# Patient Record
Sex: FEMALE | Race: WHITE | NOT HISPANIC OR LATINO | URBAN - METROPOLITAN AREA
[De-identification: names, ages, dates, MRNs, and addresses within clinical notes are randomized per-mention and may not be internally consistent; named-entity substitution may affect disease eponyms.]

---

## 2020-06-25 LAB
HEP C HIM: NORMAL
HIV 1&2 EXT: NORMAL

## 2020-07-22 ENCOUNTER — TRANSFERRED RECORDS (OUTPATIENT)
Dept: MULTI SPECIALTY CLINIC | Facility: CLINIC | Age: 25
End: 2020-07-22

## 2020-07-22 LAB — PAP-ABSTRACT: NORMAL

## 2020-12-03 ENCOUNTER — TELEPHONE (OUTPATIENT)
Dept: TRANSPLANT | Facility: CLINIC | Age: 25
End: 2020-12-03

## 2020-12-03 NOTE — TELEPHONE ENCOUNTER
"Close Window   Print This Page   Expand All  Collapse All  [facility] BREEZE  [id number]      LIVING KIDNEY DONOR EVALUATION  Donor First Name tonny Donor MRN [id number]   Donor Last Name danielle Completed 2016 12:53 PM    1989 Record ID [id number]   BREEZE Screen PASSED     Intended Recipient  Recipient First Name giovani Recipient MRN    Recipient Last Name jas Relationship Member of the same community   Recipient  1989 Recipient Diagnosis    Recipient's ABO      Donor Information  Age 26 Gender Female   Ht 165 cm (5' 5'') Race    Wt 90.2 kg (199 lbs) Ethnicity Not /   BMI 33.10 kg/m  Preferred Language English      Required No     Blood Type O   Demographics  Home Address [address] [place] [address] # [phone]   [facility] Alternate # [phone]   Zip Code [address] Type    Country [place] Preferred Contact day Tue, Mon, Thur, Wed, Fri   Email [email or web address] Preferred Contact time 1:00 PM-4:00 PM   &&   Donor's Medical Information  Medical History  Medications None Reported   Surgical History  Allergies Penicillin : Rash   Social History Tobacco: Current (1/4 ppd x 7 years)   EtOH: Rare (1-2 drinks/month)   Illicit Drug Use: Remote: Marijuana Last Used  Self-Reported Functional Status \"I am able to participate in strenuous sports such as swimming, singles tennis, football, basketball, or skiing\"   Exercise (1 X per week)   Family Medical History Hypertension (Grandparent, Aunt or Uncle)   Cancer (Aunt or Uncle, Mother, Father, Grandparent) Exercise Frequency Exercise (1 X per week)   Review of Organ Systems  Review of Systems Heart or Circulatory System=No   Airway or Lungs=No   Kidneys and Bladder=No   Digestive or Liver=No   Muscles,Bones,Joints=No   Diabetes,Thyroid,Adrenal,Endocrine Disorder=No   Blood Disorder=No   Neuro/Psych=No   Cancer=No   Female Health=No   Immune Diseases=No   &&   Donor's " Social Information  Marital Status Single Living Accommodation Lives in rented accommodation   Level of 's or technical degree complete Living Arrangement With spouse   Employment Status Part Time Concerns: health and life insurance    Employer [employer] [place] [employer] Concerns: job security and lost income    Occupation      Medical Insurance Status No medical insurance     High Risk Behavior  High Risk Behaviors Illicit IV drug use < 5yrs. (NO)   Other high risk sexual contact < 12 months. (NO)   Commercial sex < 12 months. (NO)   Blood transfusion < 12 months. (NO)   Reason for Donation  Referral Friend or Family of Tx Candidate Reason for Donation I am O+ blood type, healthy, and he is in need of a kidney.   Permission to Disclose Inquiry Yes Patient Comments    Donor Motivation Level Ready to start evaluation with reservations     PCP Contact  PCP Name    PCP [facility]    PCP [facility]    PCP Phone    Emergency Contact  First Name LYNN First Name DAVIN   Last Name AGUSTIN Last Name NOBLE   Phone # [phone] Phone # [phone]   Phone Type  Phone Type    Relationship Spouse Relationship Mother   Office Use  Reviewed By Olya Pack   Reviewed 12/3/2020 12:13 PM   Admin Folder Archive   Comments    Lost for Followup    Extended Comments    [facility] ID [email or web address]:[id number] survey status

## 2020-12-04 DIAGNOSIS — Z00.5 TRANSPLANT DONOR EVALUATION: Primary | ICD-10-CM

## 2020-12-04 NOTE — TELEPHONE ENCOUNTER
Is abo O. Interested in PEP.Hx Asthma and is under control. Tested + for COVID 11/2 but since is neg and OK.Very occ Advil use-discussed risks.Had listed IBS which occurred in 10th grade but settled down and no problems now.Has 1 beer every day.Will send info/forms. To [facility] for labs.

## 2020-12-11 ENCOUNTER — MEDICAL CORRESPONDENCE (OUTPATIENT)
Dept: HEALTH INFORMATION MANAGEMENT | Facility: CLINIC | Age: 25
End: 2020-12-11

## 2020-12-11 NOTE — TELEPHONE ENCOUNTER
"[facility] BREE[facility]  [id number]      LIVING KIDNEY DONOR EVALUATION  Donor First Name Denise Donor MRN    Donor Last Name Merlin Completed 2020 3:20 PM    1995 Record ID [id number]   BREEZE Screen PASSED     Intended Recipient  Recipient First Name Carolyn Recipient MRN    Recipient Last Name Giuliana Relationship Met through social media   Recipient  1996 Recipient Diagnosis    Recipient's ABO      Donor Information  Age 25 Gender Female   Ht 168 cm (5' 6'') Race    Wt 77.1 kg (170 lbs) Ethnicity Not /   BMI 27.40 kg/m  Preferred Language English      Required No     Blood Type O   Demographics  Home Address [address] [place] # [phone]   [facility] #    Zip Code [address] Type    Country [place] Preferred Contact day Mon, Thur, Wed, Tue   Email [email or web address] Preferred Contact time 11:00 AM-1:00 PM, 1:00 PM-4:00 PM, 09:00 AM-11:00 AM   &&   Donor's Medical Information  Medical History Anxiety d/o NOS   Asthma ( no Hospitalizations, no Intubations, no Steroid Use )   Headache (Non-Migraine)   Irritable Bowel Syndrome   Low Back Pain   Panic d/o NOS   TMJ Disorder NOS Medications Advil   Albuterol Inhaler   Dicyclomine (Tablets or Capsules)   Xanax   Surgical History Tonsillectomy and/or Adenoidectomy Allergies NKDA   Social History EtOH: Daily (1-2 drinks/day)   Illicit Drug Use: Denies   Tobacco: Denies Self-Reported Functional Status \"I am able to participate in strenuous sports such as swimming, singles tennis, football, basketball, or skiing\"   Family Medical History Cancer (denies)   Diabetes (denies)   Heart Disease (denies)   Hypertension (Mother, Father)   Kidney Disease (denies)   Kidney Stones (denies) Exercise Frequency Exercise (Not on a regular basis)   Review of Organ Systems  Review of Systems Airway or Lungs: Yes   Blood Disorder: No   Cancer: No   Diabetes,Thyroid,Adrenal,Endocrine Disorder: " No   Digestive or Liver: No   Female Health: No   Heart or Circulatory System: No   Immune Diseases: No   Kidneys and Bladder: No   Muscles,Bones,Joints: Yes   Neuro: No   Psych: Yes   &&   Donor's Social Information  Marital Status Single Living Accommodation Other   Level of Education High school or secondary school degree complete Living Arrangement With parents/guardian   Employment Status Full Time Concerns: health and life insurance No   Employer [employer] Concerns: job security and lost income No   Occupation      Medical Insurance Status Has medical insurance     High Risk Behavior  High Risk Behaviors Blood transfusion < 12 months. (NO)   Commercial sex < 12 months. (NO)   Illicit IV drug use < 5yrs. (NO)   Other high risk sexual contact < 12 months. (NO)   Reason for Donation  Referral Social Media Reason for Donation I just think its an ultimate gift to give someone and just want to help   Permission to Disclose Inquiry Yes Patient Comments    Donor Motivation Level Highly motivated donor     PCP Contact  PCP Name Sophia Rohini   PCP [facility]   PCP [facility]   PCP Phone [phone]   Emergency Contact  First Name Nakia First Name Jayne   Last Name Merlin Last Name Sandrine   Phone # [phone] Phone # [phone]   Phone Type Mobile Phone Type Mobile   Relationship Mother Relationship Friend or Other   Office Use  Reviewed By    Reviewed 12/11/2020 3:28 PM   Admin Folder Archive   Comments    Lost for Followup    Extended Comments    [facility] ID [email or web address].combined:[id number] survey status completed   Activity History  Call  Due Date 12/2/2020   Last Modified Date/Time 12/2/2020 9:39 AM   Comments    Call  Due Date 11/25/2020   Last Modified Date/Time 11/25/2020 11:41 AM   Comments

## 2020-12-16 ENCOUNTER — ALLIED HEALTH/NURSE VISIT (OUTPATIENT)
Dept: FAMILY MEDICINE | Facility: OTHER | Age: 25
End: 2020-12-16

## 2020-12-16 VITALS
WEIGHT: 162.4 LBS | DIASTOLIC BLOOD PRESSURE: 78 MMHG | HEIGHT: 66 IN | SYSTOLIC BLOOD PRESSURE: 102 MMHG | BODY MASS INDEX: 26.1 KG/M2

## 2020-12-16 DIAGNOSIS — Z01.30 BLOOD PRESSURE CHECK: Primary | ICD-10-CM

## 2020-12-16 DIAGNOSIS — Z00.5 TRANSPLANT DONOR EVALUATION: ICD-10-CM

## 2020-12-16 LAB
ABO + RH BLD: NORMAL
ABO + RH BLD: NORMAL
ALBUMIN UR-MCNC: NEGATIVE MG/DL
AMORPH CRY #/AREA URNS HPF: ABNORMAL /HPF
APPEARANCE UR: CLEAR
BACTERIA #/AREA URNS HPF: ABNORMAL /HPF
BILIRUB UR QL STRIP: NEGATIVE
COLOR UR AUTO: YELLOW
CREAT SERPL-MCNC: 0.84 MG/DL (ref 0.52–1.04)
CREAT UR-MCNC: 147 MG/DL
GFR SERPL CREATININE-BSD FRML MDRD: >90 ML/MIN/{1.73_M2}
GLUCOSE SERPL-MCNC: 88 MG/DL (ref 70–99)
GLUCOSE UR STRIP-MCNC: NEGATIVE MG/DL
HGB BLD-MCNC: 14.3 G/DL (ref 11.7–15.7)
HGB UR QL STRIP: NEGATIVE
KETONES UR STRIP-MCNC: NEGATIVE MG/DL
LEUKOCYTE ESTERASE UR QL STRIP: NEGATIVE
MICROALBUMIN UR-MCNC: <5 MG/L
MICROALBUMIN/CREAT UR: NORMAL MG/G CR (ref 0–25)
NITRATE UR QL: NEGATIVE
NON-SQ EPI CELLS #/AREA URNS LPF: ABNORMAL /LPF
PH UR STRIP: 7.5 PH (ref 5–7)
PROT UR-MCNC: 0.14 G/L
PROT/CREAT 24H UR: 0.1 G/G CR (ref 0–0.2)
RBC #/AREA URNS AUTO: ABNORMAL /HPF
SOURCE: ABNORMAL
SP GR UR STRIP: 1.02 (ref 1–1.03)
SPECIMEN EXP DATE BLD: NORMAL
UROBILINOGEN UR STRIP-ACNC: 0.2 EU/DL (ref 0.2–1)
WBC #/AREA URNS AUTO: ABNORMAL /HPF

## 2020-12-16 PROCEDURE — 82565 ASSAY OF CREATININE: CPT | Performed by: SURGERY

## 2020-12-16 PROCEDURE — 84156 ASSAY OF PROTEIN URINE: CPT | Performed by: SURGERY

## 2020-12-16 PROCEDURE — 82043 UR ALBUMIN QUANTITATIVE: CPT | Performed by: SURGERY

## 2020-12-16 PROCEDURE — 99207 PR NO CHARGE NURSE ONLY: CPT

## 2020-12-16 PROCEDURE — 81001 URINALYSIS AUTO W/SCOPE: CPT | Performed by: SURGERY

## 2020-12-16 PROCEDURE — 85018 HEMOGLOBIN: CPT | Performed by: SURGERY

## 2020-12-16 PROCEDURE — 36415 COLL VENOUS BLD VENIPUNCTURE: CPT | Performed by: SURGERY

## 2020-12-16 PROCEDURE — 82947 ASSAY GLUCOSE BLOOD QUANT: CPT | Performed by: SURGERY

## 2020-12-16 PROCEDURE — 86900 BLOOD TYPING SEROLOGIC ABO: CPT | Performed by: SURGERY

## 2020-12-16 ASSESSMENT — MIFFLIN-ST. JEOR: SCORE: 1489.39

## 2020-12-16 NOTE — PROGRESS NOTES
Patient here for blood pressure checks per transplant evaluation. 3 blood pressures were obtained 15 minutes apart per instructions.     Shawna Marquez, CMA

## 2020-12-17 ENCOUNTER — TELEPHONE (OUTPATIENT)
Dept: TRANSPLANT | Facility: CLINIC | Age: 25
End: 2020-12-17

## 2020-12-17 DIAGNOSIS — Z00.5 TRANSPLANT DONOR EVALUATION: ICD-10-CM

## 2020-12-17 NOTE — TELEPHONE ENCOUNTER
Informed Denise that labs are good. Average MPN=569.Wants to come for eval.Will do Iohexol so will do COVID test and eval labs 4 days prior.

## 2020-12-17 NOTE — TELEPHONE ENCOUNTER
Please schedule Denise for kidney donor eval on 1/8/21 slot 4. Will need Iohexol that day. Will do COVID test and eval labs on Monday 1/4/21.Is a direct/PEP donor-MV is coordinator.Will be signing up now for MyChart.I'll put in orders.

## 2020-12-18 ENCOUNTER — TELEPHONE (OUTPATIENT)
Dept: TRANSPLANT | Facility: CLINIC | Age: 25
End: 2020-12-18

## 2020-12-18 NOTE — TELEPHONE ENCOUNTER
Just to let you know--Denise is having a hard time signing up for [employer].She has been getting info after she tried to get on regarding a different Denise Boyer. I gave her the [employer] Help # but she won't be able to call them until after her work today. In the meantime she may need to get info or answer questions a different way from/for you.

## 2021-01-06 ENCOUNTER — TELEPHONE (OUTPATIENT)
Dept: TRANSPLANT | Facility: CLINIC | Age: 26
End: 2021-01-06

## 2021-01-06 DIAGNOSIS — Z00.5 TRANSPLANT DONOR EVALUATION: Primary | ICD-10-CM

## 2021-01-06 NOTE — TELEPHONE ENCOUNTER
Please CANCEL her kidney donor eval that was sched for FRIIDAY 1/8.She was unable to do COVID/labs on Monday. So far don't reschedule-will let you know--thanks!

## 2021-01-06 NOTE — TELEPHONE ENCOUNTER
I spoke to Denise today.  She cancelled her evaluation for this Friday due to not being able to get her COVID test done in time.  She had been called into work, and could not get to the appointment in time.  She also found out that she does not have short term disability insurance through her work.  She was concerned about lost wages.  I did talk with her about [facility] and [facility]'s lost wage reimbursement programs.  This was a huge relief to her.  She works as a pharmacy tech.  I screened her for mental health.  She did have a suicide attempt about 16 months ago.  She has anxiety.  She had been abusing alcohol, but not now.  I let her know my concerns about these issues in terms of her well being and recovery post donation.  She wants to think about whether or not she wants to reschedule her evaluation or not.    JACKIE Atkinson, [facility]  Clinical  and Independent Donor Advocate  [facility]  Pager:  [phone]  Direct:  [phone]

## 2021-01-06 NOTE — TELEPHONE ENCOUNTER
Was unable to get to appt on Monday for COVID/labs.Will do CrCl test now and we will CANCEL her eval on Friday.Also would like to speak with a LILI re: financial help if donating.

## 2021-01-15 ENCOUNTER — HEALTH MAINTENANCE LETTER (OUTPATIENT)
Age: 26
End: 2021-01-15

## 2021-09-04 ENCOUNTER — HEALTH MAINTENANCE LETTER (OUTPATIENT)
Age: 26
End: 2021-09-04

## 2022-02-19 ENCOUNTER — HEALTH MAINTENANCE LETTER (OUTPATIENT)
Age: 27
End: 2022-02-19

## 2022-08-23 PROBLEM — J45.909 ASTHMA: Status: ACTIVE | Noted: 2022-08-23

## 2022-08-23 PROBLEM — K58.0 IRRITABLE BOWEL SYNDROME WITH DIARRHEA: Status: ACTIVE | Noted: 2021-04-05

## 2022-08-23 PROBLEM — F33.2 SEVERE EPISODE OF RECURRENT MAJOR DEPRESSIVE DISORDER, WITHOUT PSYCHOTIC FEATURES (H): Status: ACTIVE | Noted: 2020-02-26

## 2022-08-23 RX ORDER — ALPRAZOLAM 0.5 MG
0.5 TABLET ORAL 2 TIMES DAILY PRN
COMMUNITY
Start: 2022-04-06 | End: 2022-08-24

## 2022-08-23 RX ORDER — DICYCLOMINE HCL 20 MG
20 TABLET ORAL
COMMUNITY
Start: 2021-04-02 | End: 2022-08-24

## 2022-08-23 RX ORDER — DICYCLOMINE HCL 20 MG
20 TABLET ORAL
COMMUNITY
Start: 2020-07-22 | End: 2022-08-24

## 2022-08-23 RX ORDER — ALBUTEROL SULFATE 90 UG/1
2 AEROSOL, METERED RESPIRATORY (INHALATION) EVERY 4 HOURS PRN
COMMUNITY
Start: 2020-11-19 | End: 2022-08-24

## 2022-08-23 RX ORDER — AMITRIPTYLINE HYDROCHLORIDE 75 MG/1
1 TABLET ORAL DAILY
COMMUNITY
Start: 2021-12-01 | End: 2022-08-24

## 2022-08-23 RX ORDER — ALPRAZOLAM 0.5 MG
.5-1 TABLET ORAL 2 TIMES DAILY
COMMUNITY
Start: 2020-12-04 | End: 2022-08-24

## 2022-08-23 RX ORDER — PHENTERMINE HYDROCHLORIDE 30 MG/1
30 CAPSULE ORAL
COMMUNITY
Start: 2020-07-22 | End: 2022-08-24

## 2022-08-24 ENCOUNTER — OFFICE VISIT (OUTPATIENT)
Dept: FAMILY MEDICINE | Facility: OTHER | Age: 27
End: 2022-08-24
Payer: COMMERCIAL

## 2022-08-24 VITALS
WEIGHT: 171 LBS | BODY MASS INDEX: 27.48 KG/M2 | OXYGEN SATURATION: 99 % | TEMPERATURE: 97.8 F | DIASTOLIC BLOOD PRESSURE: 80 MMHG | RESPIRATION RATE: 20 BRPM | SYSTOLIC BLOOD PRESSURE: 102 MMHG | HEIGHT: 66 IN | HEART RATE: 81 BPM

## 2022-08-24 DIAGNOSIS — Z13.220 SCREENING FOR HYPERLIPIDEMIA: ICD-10-CM

## 2022-08-24 DIAGNOSIS — Z80.9 FAMILY HISTORY OF CANCER: ICD-10-CM

## 2022-08-24 DIAGNOSIS — F33.2 SEVERE EPISODE OF RECURRENT MAJOR DEPRESSIVE DISORDER, WITHOUT PSYCHOTIC FEATURES (H): Primary | ICD-10-CM

## 2022-08-24 DIAGNOSIS — Z13.29 SCREENING FOR THYROID DISORDER: ICD-10-CM

## 2022-08-24 DIAGNOSIS — J45.20 MILD INTERMITTENT ASTHMA WITHOUT COMPLICATION: ICD-10-CM

## 2022-08-24 DIAGNOSIS — F41.1 GAD (GENERALIZED ANXIETY DISORDER): ICD-10-CM

## 2022-08-24 DIAGNOSIS — K58.0 IRRITABLE BOWEL SYNDROME WITH DIARRHEA: ICD-10-CM

## 2022-08-24 DIAGNOSIS — A60.00 GENITAL HERPES SIMPLEX, UNSPECIFIED SITE: ICD-10-CM

## 2022-08-24 LAB
ALBUMIN SERPL-MCNC: 3.8 G/DL (ref 3.4–5)
ALP SERPL-CCNC: 88 U/L (ref 40–150)
ALT SERPL W P-5'-P-CCNC: 18 U/L (ref 0–50)
ANION GAP SERPL CALCULATED.3IONS-SCNC: 5 MMOL/L (ref 3–14)
AST SERPL W P-5'-P-CCNC: 11 U/L (ref 0–45)
BILIRUB SERPL-MCNC: 0.4 MG/DL (ref 0.2–1.3)
BUN SERPL-MCNC: 15 MG/DL (ref 7–30)
CALCIUM SERPL-MCNC: 8.5 MG/DL (ref 8.5–10.1)
CHLORIDE BLD-SCNC: 108 MMOL/L (ref 94–109)
CHOLEST SERPL-MCNC: 192 MG/DL
CO2 SERPL-SCNC: 27 MMOL/L (ref 20–32)
CREAT SERPL-MCNC: 0.7 MG/DL (ref 0.52–1.04)
FASTING STATUS PATIENT QL REPORTED: NO
GFR SERPL CREATININE-BSD FRML MDRD: >90 ML/MIN/1.73M2
GLUCOSE BLD-MCNC: 83 MG/DL (ref 70–99)
HBA1C MFR BLD: 5.3 % (ref 0–5.6)
HDLC SERPL-MCNC: 64 MG/DL
LDLC SERPL CALC-MCNC: 109 MG/DL
NONHDLC SERPL-MCNC: 128 MG/DL
POTASSIUM BLD-SCNC: 4.7 MMOL/L (ref 3.4–5.3)
PROT SERPL-MCNC: 7.3 G/DL (ref 6.8–8.8)
SODIUM SERPL-SCNC: 140 MMOL/L (ref 133–144)
TRIGL SERPL-MCNC: 93 MG/DL
TSH SERPL DL<=0.005 MIU/L-ACNC: 1.85 MU/L (ref 0.4–4)

## 2022-08-24 PROCEDURE — 36415 COLL VENOUS BLD VENIPUNCTURE: CPT | Performed by: NURSE PRACTITIONER

## 2022-08-24 PROCEDURE — 84443 ASSAY THYROID STIM HORMONE: CPT | Performed by: NURSE PRACTITIONER

## 2022-08-24 PROCEDURE — 80061 LIPID PANEL: CPT | Performed by: NURSE PRACTITIONER

## 2022-08-24 PROCEDURE — 83036 HEMOGLOBIN GLYCOSYLATED A1C: CPT | Performed by: NURSE PRACTITIONER

## 2022-08-24 PROCEDURE — 99204 OFFICE O/P NEW MOD 45 MIN: CPT | Performed by: NURSE PRACTITIONER

## 2022-08-24 PROCEDURE — 80053 COMPREHEN METABOLIC PANEL: CPT | Performed by: NURSE PRACTITIONER

## 2022-08-24 RX ORDER — PHENTERMINE HYDROCHLORIDE 15 MG/1
15 CAPSULE ORAL EVERY MORNING
Qty: 30 CAPSULE | Refills: 0 | Status: SHIPPED | OUTPATIENT
Start: 2022-08-24 | End: 2022-09-26

## 2022-08-24 RX ORDER — ALPRAZOLAM 0.5 MG
.5-1 TABLET ORAL DAILY
Qty: 30 TABLET | Refills: 0 | Status: SHIPPED | OUTPATIENT
Start: 2022-08-24 | End: 2023-02-16

## 2022-08-24 RX ORDER — VALACYCLOVIR HYDROCHLORIDE 500 MG/1
500 TABLET, FILM COATED ORAL 2 TIMES DAILY
Qty: 6 TABLET | Refills: 3 | Status: SHIPPED | OUTPATIENT
Start: 2022-08-24 | End: 2022-08-27

## 2022-08-24 RX ORDER — ALBUTEROL SULFATE 90 UG/1
2 AEROSOL, METERED RESPIRATORY (INHALATION) EVERY 4 HOURS PRN
Qty: 18 G | Refills: 6 | Status: SHIPPED | OUTPATIENT
Start: 2022-08-24

## 2022-08-24 ASSESSMENT — PATIENT HEALTH QUESTIONNAIRE - PHQ9
5. POOR APPETITE OR OVEREATING: NOT AT ALL
SUM OF ALL RESPONSES TO PHQ QUESTIONS 1-9: 4

## 2022-08-24 ASSESSMENT — ANXIETY QUESTIONNAIRES
GAD7 TOTAL SCORE: 4
GAD7 TOTAL SCORE: 4
5. BEING SO RESTLESS THAT IT IS HARD TO SIT STILL: SEVERAL DAYS
7. FEELING AFRAID AS IF SOMETHING AWFUL MIGHT HAPPEN: NOT AT ALL
3. WORRYING TOO MUCH ABOUT DIFFERENT THINGS: SEVERAL DAYS
6. BECOMING EASILY ANNOYED OR IRRITABLE: NOT AT ALL
2. NOT BEING ABLE TO STOP OR CONTROL WORRYING: SEVERAL DAYS
1. FEELING NERVOUS, ANXIOUS, OR ON EDGE: SEVERAL DAYS

## 2022-08-24 ASSESSMENT — ASTHMA QUESTIONNAIRES: ACT_TOTALSCORE: 22

## 2022-08-24 NOTE — LETTER
My Asthma Action Plan    Name: Denise Boyer   YOB: 1995  Date: 8/24/2022   My doctor: GAYLE Lee CNP   My clinic: [facility]        My Rescue Medicine:   Albuterol inhaler (Proair/Ventolin/Proventil HFA)  2-4 puffs EVERY 4 HOURS as needed. Use a spacer if recommended by your provider.   My Asthma Severity:   Intermittent / Exercise Induced  Know your asthma triggers: upper respiratory infections and choking on food             GREEN ZONE   Good Control    I feel good    No cough or wheeze    Can work, sleep and play without asthma symptoms       Take your asthma control medicine every day.     1. If exercise triggers your asthma, take your rescue medication    15 minutes before exercise or sports, and    During exercise if you have asthma symptoms  2. Spacer to use with inhaler: If you have a spacer, make sure to use it with your inhaler             YELLOW ZONE Getting Worse  I have ANY of these:    I do not feel good    Cough or wheeze    Chest feels tight    Wake up at night   1. Keep taking your Green Zone medications  2. Start taking your rescue medicine:    every 20 minutes for up to 1 hour. Then every 4 hours for 24-48 hours.  3. If you stay in the Yellow Zone for more than 12-24 hours, contact your doctor.  4. If you do not return to the Green Zone in 12-24 hours or you get worse, start taking your oral steroid medicine if prescribed by your provider.           RED ZONE Medical Alert - Get Help  I have ANY of these:    I feel awful    Medicine is not helping    Breathing getting harder    Trouble walking or talking    Nose opens wide to breathe       1. Take your rescue medicine NOW  2. If your provider has prescribed an oral steroid medicine, start taking it NOW  3. Call your doctor NOW  4. If you are still in the Red Zone after 20 minutes and you have not reached your doctor:    Take your rescue medicine again and    Call 911 or go to the emergency room  right away    See your regular doctor within 2 weeks of an Emergency Room or Urgent Care visit for follow-up treatment.          Annual Reminders:  Meet with Asthma Educator,  Flu Shot in the Fall, consider Pneumonia Vaccination for patients with asthma (aged 19 and older).    Pharmacy:    [id number] [place], [place] - [address] AT [facility] [address]    Electronically signed by GAYLE Lee CNP   Date: 08/24/22                    Asthma Triggers  How To Control Things That Make Your Asthma Worse    Triggers are things that make your asthma worse.  Look at the list below to help you find your triggers and   what you can do about them. You can help prevent asthma flare-ups by staying away from your triggers.      Trigger                                                          What you can do   Cigarette Smoke  Tobacco smoke can make asthma worse. Do not allow smoking in your home, car or around you.  Be sure no one smokes at a child s day care or school.  If you smoke, ask your health care provider for ways to help you quit.  Ask family members to quit too.  Ask your health care provider for a referral to Quit Plan to help you quit smoking, or call 1-413-725-PLAN.     Colds, Flu, Bronchitis  These are common triggers of asthma. Wash your hands often.  Don t touch your eyes, nose or mouth.  Get a flu shot every year.     Dust Mites  These are tiny bugs that live in cloth or carpet. They are too small to see. Wash sheets and blankets in hot water every week.   Encase pillows and mattress in dust mite proof covers.  Avoid having carpet if you can. If you have carpet, vacuum weekly.   Use a dust mask and HEPA vacuum.   Pollen and Outdoor Mold  Some people are allergic to trees, grass, or weed pollen, or molds. Try to keep your windows closed.  Limit time out doors when pollen count is high.   Ask you health care provider about taking medicine during allergy  season.     Animal Dander  Some people are allergic to skin flakes, urine or saliva from pets with fur or feathers. Keep pets with fur or feathers out of your home.    If you can t keep the pet outdoors, then keep the pet out of your bedroom.  Keep the bedroom door closed.  Keep pets off cloth furniture and away from stuffed toys.     Mice, Rats, and Cockroaches  Some people are allergic to the waste from these pests.   Cover food and garbage.  Clean up spills and food crumbs.  Store grease in the refrigerator.   Keep food out of the bedroom.   Indoor Mold  This can be a trigger if your home has high moisture. Fix leaking faucets, pipes, or other sources of water.   Clean moldy surfaces.  Dehumidify basement if it is damp and smelly.   Smoke, Strong Odors, and Sprays  These can reduce air quality. Stay away from strong odors and sprays, such as perfume, powder, hair spray, paints, smoke incense, paint, cleaning products, candles and new carpet.   Exercise or Sports  Some people with asthma have this trigger. Be active!  Ask your doctor about taking medicine before sports or exercise to prevent symptoms.    Warm up for 5-10 minutes before and after sports or exercise.     Other Triggers of Asthma  Cold air:  Cover your nose and mouth with a scarf.  Sometimes laughing or crying can be a trigger.  Some medicines and food can trigger asthma.

## 2022-08-24 NOTE — PROGRESS NOTES
Assessment & Plan     Severe episode of recurrent major depressive disorder, without psychotic features (H)  Stable at this time per patient   - Comprehensive metabolic panel (BMP + Alb, Alk Phos, ALT, AST, Total. Bili, TP); Future  - Comprehensive metabolic panel (BMP + Alb, Alk Phos, ALT, AST, Total. Bili, TP)    Mild intermittent asthma without complication  Stable   ACT today and asthma action plan completed   - albuterol (PROAIR HFA/PROVENTIL HFA/VENTOLIN HFA) 108 (90 Base) MCG/ACT inhaler; Inhale 2 puffs into the lungs every 4 hours as needed for shortness of breath / dyspnea    Family history of cancer  Patients father has renal cancer which has been linked to a genetic disorder and patient was advised by her fathers genetic team to be evaluated for this. Referral placed.   - Adult Genetics & Metabolism Referral; Future    Irritable bowel syndrome with diarrhea  Worsens with anxiety, otherwise under control     BMI 27.0-27.9,adult  Patient has worked hard with weight loss. Trial of Phentermine about a year ago and did well with this and weight watchers. Would like to trial this again no more than 3 months. I advised her since she tolerated it we can trial it again for a short period of time. She notes she has not cardiac history and is aware of the side effects and potential impacts that can happen with this medication. Advised to drink plenty of water and be certain to keep a journal of this and her diet. She belongs to weight watchers as well. Will do labs today as well. Follow up in 1 month.   - phentermine (ADIPEX-P) 15 MG capsule; Take 1 capsule (15 mg) by mouth every morning  - Comprehensive metabolic panel (BMP + Alb, Alk Phos, ALT, AST, Total. Bili, TP); Future  - Hemoglobin A1c; Future  - Comprehensive metabolic panel (BMP + Alb, Alk Phos, ALT, AST, Total. Bili, TP)  - Hemoglobin A1c    YARI (generalized anxiety disorder)  Stable with PRN Xanax. At this time I am ok with filling this after reviewing  "her history in Careeverywhere. I advised that this should last her at least 6 months if possible.  reviewed no concerns.   - ALPRAZolam (XANAX) 0.5 MG tablet; Take 1-2 tablets (0.5-1 mg) by mouth daily  - Comprehensive metabolic panel (BMP + Alb, Alk Phos, ALT, AST, Total. Bili, TP); Future  - Comprehensive metabolic panel (BMP + Alb, Alk Phos, ALT, AST, Total. Bili, TP)    Genital herpes simplex, unspecified site  Had her first outbreak and did 10 days of Valtrex has not needed the PRN yet, but would like to have it on file. Reviewed history and plan with medication.   - valACYclovir (VALTREX) 500 MG tablet; Take 1 tablet (500 mg) by mouth 2 times daily for 3 days At the start of an outbreak  - Comprehensive metabolic panel (BMP + Alb, Alk Phos, ALT, AST, Total. Bili, TP); Future  - Comprehensive metabolic panel (BMP + Alb, Alk Phos, ALT, AST, Total. Bili, TP)    Screening for thyroid disorder    - TSH with free T4 reflex; Future  - TSH with free T4 reflex    Screening for hyperlipidemia  - Lipid panel reflex to direct LDL Fasting; Future  - Lipid panel reflex to direct LDL Fasting  }     BMI:   Estimated body mass index is 27.6 kg/m  as calculated from the following:    Height as of this encounter: 1.676 m (5' 6\").    Weight as of this encounter: 77.6 kg (171 lb).   Weight management plan: Discussed healthy diet and exercise guidelines    There are no Patient Instructions on file for this visit.    Return in about 1 month (around 9/24/2022).    GAYLE Lee CNP  [facility] EUGENIE Walker is a 27 year old, presenting for the following health issues:  Establish Care and Referral      History of Present Illness       Reason for visit:  Establish care, and I need a referral for a genetic test    She eats 2-3 servings of fruits and vegetables daily.She consumes 1 sweetened beverage(s) daily.She exercises with enough effort to increase her heart rate 9 or less minutes per day. " " She exercises with enough effort to increase her heart rate 3 or less days per week.   She is taking medications regularly.     No flowsheet data found.    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   Father has renal cancer.     IBS- D was on amiltriptalyne with this.   IBS is not as bad when her food intake is better and now her IBS is bad with anxiety.     Working on weight loss, trial of Phentermine a year ago went well. Denies any cardiac history of signs and symptoms of issues with this medication. Did this with weight watchers.     Asthma controlled for the most part, uses prn Xanax which also helps to control her IBS so she stopped her IBS control medication as she did not feel she needed it. Has not seen GI doctor in awhile.   Uses Albuterol for her asthma when needed.     No complaints for musculoskeletal or neurological issues. No history of migraines or headaches. No skin concerns.     Psychiatric- see above.   No abdominal or  concerns.   No vaginal health concerns.       Review of Systems         Objective    /80   Pulse 81   Temp 97.8  F (36.6  C) (Temporal)   Resp 20   Ht 1.676 m (5' 6\")   Wt 77.6 kg (171 lb)   SpO2 99%   BMI 27.60 kg/m    Body mass index is 27.6 kg/m .  Physical Exam   GENERAL: healthy, alert and no distress  NECK: no adenopathy, no asymmetry, masses, or scars and thyroid normal to palpation  RESP: lungs clear to auscultation - no rales, rhonchi or wheezes  CV: regular rate and rhythm, normal S1 S2, no S3 or S4, no murmur, click or rub, no peripheral edema and peripheral pulses strong  SKIN: no suspicious lesions or rashes  NEURO: Normal strength and tone, mentation intact and speech normal  PSYCH: mentation appears normal, affect normal/bright    Results for orders placed or performed in visit on 08/24/22   TSH with free T4 reflex     Status: Normal   Result Value Ref Range    TSH 1.85 0.40 - 4.00 mU/L "   Comprehensive metabolic panel (BMP + Alb, Alk Phos, ALT, AST, Total. Bili, TP)     Status: Normal   Result Value Ref Range    Sodium 140 133 - 144 mmol/L    Potassium 4.7 3.4 - 5.3 mmol/L    Chloride 108 94 - 109 mmol/L    Carbon Dioxide (CO2) 27 20 - 32 mmol/L    Anion Gap 5 3 - 14 mmol/L    Urea Nitrogen 15 7 - 30 mg/dL    Creatinine 0.70 0.52 - 1.04 mg/dL    Calcium 8.5 8.5 - 10.1 mg/dL    Glucose 83 70 - 99 mg/dL    Alkaline Phosphatase 88 40 - 150 U/L    AST 11 0 - 45 U/L    ALT 18 0 - 50 U/L    Protein Total 7.3 6.8 - 8.8 g/dL    Albumin 3.8 3.4 - 5.0 g/dL    Bilirubin Total 0.4 0.2 - 1.3 mg/dL    GFR Estimate >90 >60 mL/min/1.73m2   Hemoglobin A1c     Status: Normal   Result Value Ref Range    Hemoglobin A1C 5.3 0.0 - 5.6 %   Lipid panel reflex to direct LDL Fasting     Status: Abnormal   Result Value Ref Range    Cholesterol 192 <200 mg/dL    Triglycerides 93 <150 mg/dL    Direct Measure HDL 64 >=50 mg/dL    LDL Cholesterol Calculated 109 (H) <=100 mg/dL    Non HDL Cholesterol 128 <130 mg/dL    Patient Fasting > 8hrs? No     Narrative    Cholesterol  Desirable:  <200 mg/dL    Triglycerides  Normal:  Less than 150 mg/dL  Borderline High:  150-199 mg/dL  High:  200-499 mg/dL  Very High:  Greater than or equal to 500 mg/dL    Direct Measure HDL  Female:  Greater than or equal to 50 mg/dL   Male:  Greater than or equal to 40 mg/dL    LDL Cholesterol  Desirable:  <100mg/dL  Above Desirable:  100-129 mg/dL   Borderline High:  130-159 mg/dL   High:  160-189 mg/dL   Very High:  >= 190 mg/dL    Non HDL Cholesterol  Desirable:  130 mg/dL  Above Desirable:  130-159 mg/dL  Borderline High:  160-189 mg/dL  High:  190-219 mg/dL  Very High:  Greater than or equal to 220 mg/dL         The patient indicates understanding of these issues and agrees with the plan.      .  ..

## 2022-08-24 NOTE — LETTER
August 24, 2022      Denise Boyer  [address] [place] [address]          To Whom It May Concern:    Denise Boyer was seen in our clinic this morning, 8/24/2022. Please excuse today's absence. If you have any questions please give our clinic a call at [phone].       Sincerely,        GAYLE Lee CNP

## 2022-08-24 NOTE — LETTER
My Depression Action Plan  Name: Denise Boyer   Date of Birth 1995  Date: 8/24/2022    My doctor: Gladys Wood   My clinic: [address]  [facility] [id number] [phone]          GREEN    ZONE   Good Control    What it looks like:     Things are going generally well. You have normal ups and downs. You may even feel depressed from time to time, but bad moods usually last less than a day.   What you need to do:  1. Continue to care for yourself (see self care plan)  2. Check your depression survival kit and update it as needed  3. Follow your physician s recommendations including any medication.  4. Do not stop taking medication unless you consult with your physician first.           YELLOW         ZONE Getting Worse    What it looks like:     Depression is starting to interfere with your life.     It may be hard to get out of bed; you may be starting to isolate yourself from others.    Symptoms of depression are starting to last most all day and this has happened for several days.     You may have suicidal thoughts but they are not constant.   What you need to do:     1. Call your care team. Your response to treatment will improve if you keep your care team informed of your progress. Yellow periods are signs an adjustment may need to be made.     2. Continue your self-care.  Just get dressed and ready for the day.  Don't give yourself time to talk yourself out of it.    3. Talk to someone in your support network.    4. Open up your Depression Self-Care Plan/Wellness Kit.           RED    ZONE Medical Alert - Get Help    What it looks like:     Depression is seriously interfering with your life.     You may experience these or other symptoms: You can t get out of bed most days, can t work or engage in other necessary activities, you have trouble taking care of basic hygiene, or basic responsibilities, thoughts of suicide or death  that will not go away, self-injurious behavior.     What you need to do:  1. Call your care team and request a same-day appointment. If they are not available (weekends or after hours) call your local crisis line, emergency room or 911.          Depression Self-Care Plan / Wellness Kit    Many people find that medication and therapy are helpful treatments for managing depression. In addition, making small changes to your everyday life can help to boost your mood and improve your wellbeing. Below are some tips for you to consider. Be sure to talk with your medical provider and/or behavioral health consultant if your symptoms are worsening or not improving.     Sleep   Sleep hygiene  means all of the habits that support good, restful sleep. It includes maintaining a consistent bedtime and wake time, using your bedroom only for sleeping or sex, and keeping the bedroom dark and free of distractions like a computer, smartphone, or television.     Develop a Healthy Routine  Maintain good hygiene. Get out of bed in the morning, make your bed, brush your teeth, take a shower, and get dressed. Don t spend too much time viewing media that makes you feel stressed. Find time to relax each day.    Exercise  Get some form of exercise every day. This will help reduce pain and release endorphins, the  feel good  chemicals in your brain. It can be as simple as just going for a walk or doing some gardening, anything that will get you moving.      Diet  Strive to eat healthy foods, including fruits and vegetables. Drink plenty of water. Avoid excessive sugar, caffeine, alcohol, and other mood-altering substances.     Stay Connected with Others  Stay in touch with friends and family members.    Manage Your Mood  Try deep breathing, massage therapy, biofeedback, or meditation. Take part in fun activities when you can. Try to find something to smile about each day.     Psychotherapy  Be open to working with a therapist if your provider  recommends it.     Medication  Be sure to take your medication as prescribed. Most anti-depressants need to be taken every day. It usually takes several weeks for medications to work. Not all medicines work for all people. It is important to follow-up with your provider to make sure you have a treatment plan that is working for you. Do not stop your medication abruptly without first discussing it with your provider.    Crisis Resources   These hotlines are for both adults and children. They and are open 24 hours a day, 7 days a week unless noted otherwise.      National Suicide Prevention Lifeline   988 or 3-704-490-JJPF (7136)      Crisis Text Line    www.crisistextline.org  Text HOME to 809312 from anywhere in the [place], anytime, about any type of crisis. A live, trained crisis counselor will receive the text and respond quickly.      [facility]ani Lifeline for LGBTQ Youth  A national crisis intervention and suicide lifeline for LGBTQ youth under 25. Provides a safe place to talk without judgement. Call 1-749.976.5477; text START to 778219 or visit www.thetrevorproject.org to talk to a trained counselor.      For [facility] crisis numbers, visit the [facility] website at:  https://[place].gov/dhs/people-we-serve/adults/health-care/mental-health/resources/crisis-contacts.jsp

## 2022-08-26 ENCOUNTER — TELEPHONE (OUTPATIENT)
Dept: FAMILY MEDICINE | Facility: OTHER | Age: 27
End: 2022-08-26

## 2022-08-26 ENCOUNTER — PATIENT OUTREACH (OUTPATIENT)
Dept: ONCOLOGY | Facility: CLINIC | Age: 27
End: 2022-08-26

## 2022-08-26 DIAGNOSIS — Z80.9 FAMILY HISTORY OF CANCER: Primary | ICD-10-CM

## 2022-08-26 NOTE — TELEPHONE ENCOUNTER
New order placed for consult       GAYLE Lee CNP  Questions or concerns please feel free to send me a [employer] message or call me  Phone : [phone]

## 2022-08-31 ENCOUNTER — VIRTUAL VISIT (OUTPATIENT)
Dept: ONCOLOGY | Facility: CLINIC | Age: 27
End: 2022-08-31
Attending: NURSE PRACTITIONER
Payer: COMMERCIAL

## 2022-08-31 DIAGNOSIS — Z80.51 FAMILY HISTORY OF MALIGNANT NEOPLASM OF KIDNEY: Primary | ICD-10-CM

## 2022-08-31 DIAGNOSIS — Z80.0 FAMILY HISTORY OF MALIGNANT NEOPLASM OF COLON: ICD-10-CM

## 2022-08-31 DIAGNOSIS — Z84.81 FAMILY HISTORY OF CARRIER OF GENETIC DISEASE: ICD-10-CM

## 2022-08-31 DIAGNOSIS — Z80.8 FAMILY HISTORY OF MALIGNANT NEOPLASM OF THYROID: ICD-10-CM

## 2022-08-31 DIAGNOSIS — Z80.3 FAMILY HISTORY OF MALIGNANT NEOPLASM OF BREAST: ICD-10-CM

## 2022-08-31 PROCEDURE — 96040 HC GENETIC COUNSELING, EACH 30 MINUTES: CPT | Mod: TEL,95 | Performed by: GENETIC COUNSELOR, MS

## 2022-08-31 NOTE — PATIENT INSTRUCTIONS
Assessing Cancer Risk  Only about 5-10% of cancers are thought to be due to an inherited cancer susceptibility gene.    These families often have:  Several people with the same or related types of cancer  Cancers diagnosed at a young age (before age 50)  Individuals with more than one primary cancer  Multiple generations of the family affected with cancer    We each inherit two copies of every gene in our bodies: one from our mother and one from our father.  Each gene has a specific job to do.  When a gene has a mistake or  mutation  in it, it does not work like it should.     Von Hippel-Lindau Disease (VHL)  Currently one gene is known to cause Von Hippel-Lindau disease: VHL.  A single mutation in the VHL gene increases the risk for kidney cysts and cancer, pheochromocytoma, and neuroendocrine tumors of the pancreas.  Pheochromocytomas are usually non-cancerous tumors that occur on the adrenal glands, which sit on top of the kidneys.  Renal cell carcinoma (RCC) of the kidneys is cancerous and occurs in 70% of people with VHL by age 60.    A main feature of VHL is hemangioblastomas--tumor-like knots of the blood vessels--in the brain, spine, and retina of the eye.  This can cause vision loss.  Some people with VHL will have hearing loss due to tumors in the inner ear.  Men will often have benign cysts of the epididymis.            BAP1 Tumor Predisposition Syndrome  BAP1 Tumor Predisposition Syndrome is caused by mutations in the BAP1 gene.  As this is a rare syndrome, there are currently no lifetime cancer risk estimates for the BAP1-associated cancers. As new information becomes available, more concrete lifetime cancer risks may also become available. Associated cancers and tumors include: atypical Spitz tumors,  uveal (eye) melanoma, mesothelioma,  cutaneous (skin) melanoma, kidney cancer (typically clear cell) and possibly other cancers (including basal cell carcinoma, breast, thyroid, meningioma, and  neuroendocrine tumors). Currently there are no estimates regarding the prevalence or lifetime risk of these cancers.    Hereditary Leiomyomatosis and Renal Cell Cancer (HLRCC)  HLRCC is caused by mutations in the FH gene. Individuals with HLRCC typically develop tumors of the smooth muscle and skin, called leiomyomas. About 76% of individuals with HLRCC with develop these skin findings. Women can also develop leiomyomas of the uterus. Mutations in the FH gene also lead to higher risk to develop kidney cancer, typically papillary renal cancer type 2. Some data suggests that FH gene mutations may also lead to risk for PGLs/PCCs.    Pa-Adrián Aries Syndrome  Qyrx-Hwuc-Kgpr syndrome (BHDS) is caused by mutations in the FLCN gene and affects the skin, lungs, and kidneys.  Individuals can have benign skin tumors, particularly in the head and neck region.  Individuals with BHDS can also experience pneumothorax (lung collapse) due to cysts that develop on the lungs.  Renal tumors (benign or malignant) can be seen in individuals with BHDS.      Inheritance   It is important to remember that inheriting a mutation does not mean a person will develop cancer, but it does significantly increase his or her risk above the general population risk.    VHL, BAP1, FH and FLCN mutations are inherited in an autosomal dominant pattern.  This means that if a parent has a mutation, each of his or her children will have a 50% chance of inheriting that same mutation.  Therefore, each child--male or female--would have a 50% chance of being at increased risk for developing cancer and the associated benign tumors.      Image obtained from Genetics Home Reference, 2013    Genetic Testing  Genetic testing involves a blood test and will look at the genetic information in the VHL, BAP1, FH, and FLCN genes for any harmful mutations that are associated with increased cancer risk.  If possible, it is recommended that the person(s) who has had cancer or  the characteristic tumors be tested first before other family members.  That person will give us the most useful information about whether or not the VHL gene is associated with the cancer in the family.    Results  There are three possible results of genetic testing:  Positive--a harmful mutation was identified   Negative--no mutation was identified   Variant of unknown significance--a variation in VHL, BAP1, FH, or FLCN was identified, but it is unclear how this impacts cancer risk in the family    Advantages and Disadvantages  There are advantages and disadvantages to genetic testing.    Advantages  May clarify your cancer risk  Can help you make medical decisions  May explain the cancers in your family  May give useful information to your family members (if you share your results)    Disadvantages  Possible negative emotional impact of learning about inherited cancer risk  Uncertainty in interpreting a negative test result in some situations  Possible genetic discrimination concerns (see below)    Genetic Information Nondiscrimination Act (KANDACE)  KANDACE is a federal law that protects individuals from health insurance or employment discrimination based on a genetic test result alone.  Although rare, there are currently no legal protections in terms of life insurance, long term care, or disability insurances.  Visit the National Human Genome Research [place] genome.gov/92543647 to learn more.    Reducing Cancer Risk  The following screenings options are subject to change as guidelines may be updated. These options should be discussed with an individual's primary care provider to determine at what age to begin screening, what screening is appropriate, and if additional screening is necessary based on personal/family history factors.     Von Hippel-Lindau disease screening1,2:  Retinal Angioma:  Annual ophthalmology exam starting in infancy  Central nervous system hemangioblastomas: MRI of the head and spine  starting at age 11; repeated every 1-3 years  Renal and pancreatic tumors:  Annual ultrasound or MRI of abdomen starting at age 16  Pheochromocytoma:   Annual CT of abdomen starting at age 18 or earlier  Annual 24 hour urine catecholamines starting at age 8  Annual Plasma normetanephrine starting at age 8  Inner ear:    CT and MRI of internal auditory canals at onset of symptoms; repeat as needed  Audiology function tests    BAP1 cancer screening3:  Annual dilated eye exam, ideally with an ocular oncologist - begin at age 11  Annual dermatologic exams - begin at age 20  Self skin exams following ABCDE melanoma characteristics  Regular use of sun protection  Consideration of annual abdominal ultrasound and urine analysis - age at initiation of screening unknown but could begin at age of diagnosis or based on family history  Consider abdominal MRI every two years (with possible inclusion of pleura and peritoneum) - age at initiation of screening unknown but could begin at age of diagnosis or based on family history  Consider annual physical with particular attention to symptoms of mesothelioma - age at initiation of screening unknown but could begin at age of diagnosis or based on family history.    HLRCC screening:  Uterine leiomyomas: Annual gynecological exams  Cutaneous leiomyomas: Dermatological exams every 1-2 years  Renal cancer surveillance: Annual MRI of the abdomen with contrast   Blood work    Coic-Benx-Nzaw screening4-6:  Individuals with BHDS should undergo regular abdominal imaging due to the risk for developing these renal tumors and cancer risk, including annual MRI imaging of the kidneys.      Detailed dermatologic examination and punch biopsy of suspected cutaneous lesions, with consideration of full body skin examination.   Baseline high resolution chest CT for visualization of pulmonary cysts is recommended for individuals diagnosed with BHDS, with monitoring at regular intervals.  Exposure to  large ambient pressure differences could precipitate pneumothorax, and smoking is a risk factor which should be avoided.      Questions to Think About Regarding Genetic Testing  What effect will the test result have on me and my relationship with my family members if I have an inherited gene mutation?  If I don t have a gene mutation?  Should I share my test results, and how will my family react to this news, which may also affect them?  Are my children ready to learn new information that may one day affect their own health?    Resources  VHL Family [place] vhl.org   Kidney Cancer Association kidneycancer.org   American Cancer Society (ACS) cancer.org   National Cancer [place] (NCI) cancer.gov     Please call us if you have any questions or concerns.   Cancer Risk Management Program [phone]-CANCER ([phone])  Aron Latham, MS [facility]  [phone]  Emelia Franky, MS, [facility] [phone]  Sivan Lundy, MS, [facility]  [phone]  Flor Noble, MS, [facility]  [phone]  Hailee Howell, MS, [facility]  [phone]  Melody Baez, MS, [facility] [phone]  Violet Ackerman, MS, [facility] [phone]    References  Dai ER, Susan HP, Gris HERNDON. von Hippel-Lindau disease: a clinical and scientific review. Eur J Hum Gogo. 2011;19:617-23.  Alexi RR, Ben GM, Ganga M, Chew EY, Libutti SK, Shanel WM, Helena EH. von Hippel-Lindau disease. Lancet. 2003;361:2059-67  MIREYA Jaffe., MIR Swanson, Ashley C. M., & Jim?Oliver, M. H. (2016). Comprehensive review of BAP1 tumor predisposition syndrome with report of two new cases. Clinical genetics, 89(3), 285-294.  CHRISSY Perez. (2014). Kmgr-Ssxh-CnxÃ  Syndrome. In GeneReviews[Internet]. [employer], [place].  BEAU Rodriguez., nabor Mendez, M. A., & Oli SFrancisco (2009). Lizz elam, gris HERNDON, Vivek S, Best M, Richard TV, Rashel J, Dai er;  BHD Consortium. Qvzz-Orad-Hwoé syndrome: diagnosis and management. Lancet Oncol, 10, 1199-206.  NORTH Costa., RYAN Nagy, THA Zavala.  PATO, VEE العراقي, VEE Cr, & TOMMIE Alanis (2017). Bvjp-Tygp-Cadé syndrome: a case report and a review of the literature.  clinical respiratory journal, 4(1), 8764136.

## 2022-08-31 NOTE — PROGRESS NOTES
2022    Referring Provider: GAYLE Harding CNP    Presenting Information:   I met with Denise for her telephone genetic counseling visit, through the Cancer Risk Management Program, to discuss her family history of renal, breast, colon, and thyroid cancer. Today we reviewed this history, cancer screening recommendations, and available genetic testing options.    Personal History:  Denise is a 27 year old year old female. She does not have any personal history of cancer. She had her first menstrual period at age 10 or 11, she does not have children, and is premenopausal. Denise has her ovaries, fallopian tubes and uterus in place, and she has had no ovarian cancer screening to date. She reports that she has not used hormone replacement therapy. She has not had a mammogram. Her most recent colonoscopy in 2014 was normal and follow-up was recommended in at age 49. She does not regularly do any other cancer screening at this time. Denise reported current tobacco use and moderate alcohol use.    Family History: (Please see scanned pedigree for detailed family history information)    Denise's father was diagnosed with transitional renal cancer at age 61. He is also reported to have a history of lung cysts. He had genetic testing with [facility]'s renal/urinary tract cancers panel and was found to have a FLCN mutation. Specifically, this mutation is c.1252del (p.[id number]*50). He tested negative for 24 genes: BAP1, CDC73, CDKN1C, DICER1, DIS3L2, EPCAM, FH, GPC3, MET, MLH1, MSH2, MSH6, PMS2, PTEN, REST, SDHB, SDHC, SMARCA4, SMARCB1, TP53, TSC1, TSC2, VHL, and WT1.    Denise's paternal grandmother was diagnosed with skin cancer on her nose at an unknown age and  at age 87.    Denise's maternal grandmother was diagnosed with colon, thyroid, and breast cancer all over the age of 60.    Her maternal ethnicity is [place]. Her paternal ethnicity is [place]. There is no known Ashkenazi Mu-ism ancestry on  either side of her family. There is no reported consanguinity.    Discussion:    We discussed the natural history and genetics of hereditary cancer. A detailed handout regarding hereditary cancer, along with the other information we discussed, will be mailed to Denise at the end of our appointment today and can be found in the after visit summary. Topics included: inheritance pattern, cancer risks, cancer screening recommendations, and also risks, benefits and limitations of testing.  We discuss her father's diagnosis of Kxtw-Lfnk-Aenn syndrome (BHD). BHD is caused by mutations in the FLCN gene and affects the skin, lungs, and kidneys. Individuals can have benign skin tumors, particularly in the head and neck region. They can also experience pneumothorax (lung collapse) due to cysts that develop on the lungs. Renal tumors (benign or malignant) can be seen in individuals with BHDS.    Based on Denise's father's genetic test result, Denise has a 50% chance of also carrying the same genetic change in the FLCN gene.    Based on her personal and family history, Denise meets current National Comprehensive Cancer Network (NCCN) criteria for genetic testing of FLCN.  Genetic testing is available for: FLCN site-specific testing for the known familial mutation c.1252del (p.Ljg456Ucoch*50) or panel testing. Given the limited cancer history on her maternal side of the family, other than her grandmother, Denise elected to pursue genetic testing for the known FLCN mutation first identified in her father.  We discussed that Denise is encouraged to talk to her grandmother or other close maternal relatives about genetic testing given her grandmother's history of cancer.    Denise stated that she would prefer to submit a saliva kit for her genetic testing. Lexie will send a kit directly to her home with directions on how to collect a saliva sample. We discussed that there is a small chance for sample failure due to  contamination of the sample. To help minimize this, she should follow the directions that are sent with the kit. Denise verbalized understanding of this. Once the sample is collected, she will send it to [employer] using the return envelope and prepaid shipping label.     Verbal consent was given over the phone and written on the consent form. Turnaround time is approximately 2-3 weeks once the lab receives the sample.    Medical Management: For Denise, we reviewed that the information from genetic testing may determine:    additional cancer screening for which Denise may qualify (i.e. lung and abdominal imaging, more frequent dermatologic exams, etc.),    These recommendations will be discussed in detail once genetic testing is completed.     Plan:  1) Today Denise elected to proceed with FLCN site specific testing through [facility].  2) A copy of the consent form and the after visit summary will be mailed to Denise.  3) This information should be available in approximately 2-3 weeks, once the lab receives the sample.  4) I will call Denise with the results once they become available.    Time spent on the phone: 40 minutes    ADDENDUM: Patient changed her mind and elected to just do testing for the known FLCN mutation identified in her father. The discussion has been updated to reflect this change.    Aron Latham MS, [facility]  Licensed, Certified Genetic Counselor      Denise is a 27 year old who is being evaluated via a billable telephone visit.      What phone number would you like to be contacted at? [phone]  How would you like to obtain your AVS? Sharita

## 2022-08-31 NOTE — LETTER
Cancer Risk Management  Program Locations    [facility]  [facility]  [facility]  [facility]  [facility]  Mailing Address  Cancer Risk Management Program  [facility]  [address]  [place] [address]    New patient appointments  [phone]  August 31, 2022    Denise Boyer  [address] [place] [address]    Dear Denise,    It was a pleasure speaking with you on the phone on 8/31/2022. Here is a copy of the progress note from our discussion. If you have any additional questions, please feel free to call.    Referring Provider: GAYLE Harding CNP    Presenting Information:   I met with Denise for her telephone genetic counseling visit, through the Cancer Risk Management Program, to discuss her family history of renal, breast, colon, and thyroid cancer. Today we reviewed this history, cancer screening recommendations, and available genetic testing options.    Personal History:  Denise is a 27 year old year old female. She does not have any personal history of cancer. She had her first menstrual period at age 10 or 11, she does not have children, and is premenopausal. Denise has her ovaries, fallopian tubes and uterus in place, and she has had no ovarian cancer screening to date. She reports that she has not used hormone replacement therapy. She has not had a mammogram. Her most recent colonoscopy in April 2014 was normal and follow-up was recommended in at age 49. She does not regularly do any other cancer screening at this time. Denise reported current tobacco use and moderate alcohol use.    Family History: (Please see scanned pedigree for detailed family history information)    Denise's father was diagnosed with transitional renal cancer at age 61. He is also reported to have a history of lung cysts. He had genetic testing with [employer]'s  renal/urinary tract cancers panel and was found to have a FLCN mutation. Specifically, this mutation is c.1252del (p.[id number]*50). He tested negative for 24 genes: BAP1, CDC73, CDKN1C, DICER1, DIS3L2, EPCAM, FH, GPC3, MET, MLH1, MSH2, MSH6, PMS2, PTEN, REST, SDHB, SDHC, SMARCA4, SMARCB1, TP53, TSC1, TSC2, VHL, and WT1.    Denise's paternal grandmother was diagnosed with skin cancer on her nose at an unknown age and  at age 87.    Denise's maternal grandmother was diagnosed with colon, thyroid, and breast cancer all over the age of 60.    Her maternal ethnicity is Romansh. Her paternal ethnicity is Macedonian. There is no known Ashkenazi Anglican ancestry on either side of her family. There is no reported consanguinity.    Discussion:    We discussed the natural history and genetics of hereditary cancer. A detailed handout regarding hereditary cancer, along with the other information we discussed, will be mailed to Denise at the end of our appointment today and can be found in the after visit summary. Topics included: inheritance pattern, cancer risks, cancer screening recommendations, and also risks, benefits and limitations of testing.  We discuss her father's diagnosis of Gvmw-Rffl-Qlsj syndrome (BHD). BHD is caused by mutations in the FLCN gene and affects the skin, lungs, and kidneys. Individuals can have benign skin tumors, particularly in the head and neck region. They can also experience pneumothorax (lung collapse) due to cysts that develop on the lungs. Renal tumors (benign or malignant) can be seen in individuals with BHDS.    Based on Denise's father's genetic test result, Denise has a 50% chance of also carrying the same genetic change in the FLCN gene.    Based on her personal and family history, Denise meets current National Comprehensive Cancer Network (NCCN) criteria for genetic testing of FLCN.    Genetic testing is available for: FLCN site-specific testing for the known familial mutation  c.1252del (p.[id number]*50) or panel testing. Given Denise's maternal grandmother's personal history of 3 different cancers, there is a higher probability of an underlying hereditary cause to this cancer history.     We discussed that Denise's grandmother meets the American Society of Breast Surgeons Consensus Guideline on Genetic Testing for Hereditary Breast Cancer, in that genetic testing should be available to all patients who have been diagnosed with breast cancer. Denise stated that neither her grandmother nor mother are interested in genetic testing, and thus it would be appropriate for Denise to pursue genetic testing to better understand her risk for hereditary cancer from her maternal side of the family.    We discussed that there are additional genes that could cause increased risk for breast, colon, and/or thyroid cancer. As many of these genes present with overlapping features in a family and accurate cancer risk cannot always be established based upon the pedigree analysis alone, it would be reasonable for Denise to consider panel genetic testing to analyze multiple genes at once.    Genetic testing is available for BRCA1/2 as part of the patient's core panel. This will then be automatically reflexed to a Custom Cancer panel.   Genetic testing is available for 50 genes associated with hereditary cancer: Custom Cancers panel (APC, DAWSON, AXIN2, BARD1, BMPR1A, BRCA1, BRCA2, BRIP1, CDH1, CDK4, CDKN2A, CHEK2, CTNNA1, DICER1, EPCAM, FLCN, GREM1, HOXB13, KIT, MEN1, MLH1, MSH2, MSH3, MSH6, MUTYH, NBN, NF1, NTHL1, PALB2, PDGFRA, PMS2, POLD1, POLE, RBUBC3I, PTEN, RAD50, RAD51C, RAD51D, RET, SDHA, SDHB, SDHC, SDHD, SMAD4, SMARCA4, STK11, TP53, TSC1, TSC2, and VHL).  We discussed that many of the genes in the Custom Cancers panel are associated with specific hereditary cancer syndromes and published management guidelines: Hereditary Breast and Ovarian Cancer syndrome (BRCA1, BRCA2), Fabian syndrome (MLH1,  MSH2, MSH6, PMS2, EPCAM), Familial Adenomatous Polyposis (APC), Hereditary Diffuse Gastric Cancer (CDH1), Familial Atypical Multiple Mole Melanoma syndrome (CDK4, CDKN2A), Juvenile Polyposis syndrome (BMPR1A, SMAD4), Cowden syndrome (PTEN), Li Fraumeni syndrome (TP53), Peutz-Jeghers syndrome (STK11), MUTYH Associated Polyposis (MUTYH), Tuberous Sclerosis complex (TSC1, TSC2), Neurofibromatosis type 1 (NF1), Multiple Endocrine Neoplasia type 1 (MEN1), Multiple Endocrine Neoplasia type 2 (RET), Hereditary Paraganglioma and Pheochromocytoma (SDHA, SDHB, SDHC, SDHD), Fvjb-Jkdj-Qacf syndrome (FLCN), White Complex (VJXVH9T), and von Hippel-Lindau (VHL).   The DAWSON, AXIN2, BRIP1, CHEK2, GREM1, MSH3, NBN, NTHL1, PALB2, POLD1, POLE, RAD51C, and RAD51D genes are associated with increased cancer risk and have published management guidelines for certain cancers.    The remaining genes (BARD1, CTNNA1, DICER1, HOXB13, KIT, PDGFRA, RAD50, and SMARCA4) are associated with increased cancer risk and may allow us to make medical recommendations when mutations are identified.      Denise stated that she would prefer to submit a saliva kit for her genetic testing. [facility] will send a kit directly to her home with directions on how to collect a saliva sample. We discussed that there is a small chance for sample failure due to contamination of the sample. To help minimize this, she should follow the directions that are sent with the kit. Denise verbalized understanding of this. Once the sample is collected, she will send it to [facility] using the return envelope and prepaid shipping label.     Verbal consent was given over the phone and written on the consent form. Turnaround time is approximately 4 weeks once the lab receives the sample.    Medical Management: For Denise, we reviewed that the information from genetic testing may determine:    additional cancer screening for which Denise may qualify (i.e. mammogram and breast MRI, lung  and abdominal imaging, more frequent dermatologic exams, etc.),    options for risk reducing surgeries Denise could consider (i.e. bilateral mastectomy, surgery to remove her ovaries and/or uterus, etc.),      and targeted chemotherapies if she were to develop certain cancers in the future (i.e. immunotherapy for individuals with Fabian syndrome, PARP inhibitors, etc.).     These recommendations and possible targeted chemotherapies will be discussed in detail once genetic testing is completed.     Plan:  1) Today Denise elected to proceed with BRCA1/2 with automatic reflex to a Custom Cancer panel through [employer].  2) A copy of the consent form and the after visit summary will be mailed to Denise.  3) This information should be available in approximately 4 weeks, once the lab receives the sample.  4) I will call Denise with the results once they become available.    Time spent on the phone: 40 minutes    Aron Latham MS, [facility]  Licensed, Certified Genetic Counselor

## 2022-09-23 ENCOUNTER — TELEPHONE (OUTPATIENT)
Dept: ONCOLOGY | Facility: CLINIC | Age: 27
End: 2022-09-23
Payer: COMMERCIAL

## 2022-09-23 NOTE — Clinical Note
"Hello,    Please enclose a copy of the test report from the laboratory tab titled \"laboratory miscellaneous order\" dated 9/7/22 (Order [id number]) to send to the patient along with the letter.    Referring provider: please see below for summary of genetic test results for your reference.    Thank you,  Aron"

## 2022-09-23 NOTE — LETTER
"    Cancer Risk Management  Program Locations    [facility]  [facility]  [facility]  Mailing Address  Cancer Risk Management Program  [facility]  [address]  [place] [address]    New patient appointments  [phone]  September 23, 2022    Denise Boyer  [address] [place] [address]      Dear Denise,    It was a pleasure speaking with you on the phone on 9/23/2022. Here is a copy of the progress note from our discussion. If you have any additional questions, please feel free to call.     Referring Provider: GAYLE Harding CNP    Presenting Information:  I spoke to Denise by telephone today to discuss her genetic testing results. A saliva kit was sent to her house on 8/31/22. FLCN testing was ordered from [facility]. This testing was done because of Denise's personal and family history of kidney, colon, breast, and thyroid cancer and known FLCN mutation.    Genetic Testing Result: NEGATIVE  Denise's test results were negative. Denise does not have the mutation previously identified in her father. The mutation that was identified in her father was in the FLCN gene. Specifically, this mutation is called c.1252del (p.[id number]*50). This test only looked for this one mutation.      A copy of the test report can be found in the Laboratory tab, dated 9/7/22, and named \"LABORATORY MISCELLANEOUS ORDER\". The report is scanned in as a linked document.    Interpretation:  We discussed that given the known FLCN mutation that was identified in her father, Denise is at low risk for hereditary cancer given that she was negative for the known mutation in the family.          Screening:  Based on this negative test result, it is important for Denise and her relatives to refer back to the family history for appropriate cancer " screening.      Population cancer screening options, such as those recommended by the American Cancer Society and the National Comprehensive Cancer Network (NCCN), are also appropriate for Denise and her family. These screening recommendations may change if there are changes to Denise's personal and/or family history of cancer. Final screening recommendations should be made by each individual's primary care provider.      Inheritance:  We reviewed autosomal dominant inheritance. We discussed that Denise cannot pass on an identifiable mutation in these genes to her children based on this test result. Mutations in these genes do not skip generations.      Additional Testing Considerations:  Although Denise's genetic testing result was negative, other relatives may still carry a gene mutation associated with breast, colon, thyroid, and/or kidney cancer. Genetic counseling is recommended for her maternal grandmother and other close paternal relatives to discuss genetic testing options. If any of these relatives do pursue genetic testing, Denise is encouraged to contact me so that we may review the impact of their test results on her.    Summary:  We do not have an explanation for Denise's maternal family history of cancer. While no genetic changes were identified, Denise may still be at risk for certain cancers due to family history, environmental factors, or other genetic causes not identified by this test. Because of that, it is important that she continue with cancer screening based on her personal and family history as discussed above.    Genetic testing is rapidly advancing, and new cancer susceptibility genes will most likely be identified in the future. Therefore, I encouraged Denise to contact me annually or if there are changes in her personal or family history. This may change how we assess her cancer risk, screening, and the testing we would offer.    Plan:  1.  A copy of the test results will be  mailed to Denise.  2. She plans to follow-up with her other providers.  3. She should contact me regularly, or sooner if her family history changes.    If Denise has any further questions, I encouraged her to contact me via [employer].    Time spent on the phone: 5 minutes.    Aron Latham MS, [facility]  Licensed, Certified Genetic Counselor

## 2022-09-23 NOTE — TELEPHONE ENCOUNTER
"9/23/2022    Referring Provider: GAYLE Harding CNP    Presenting Information:  I spoke to Denise by telephone today to discuss her genetic testing results. A saliva kit was sent to her house on 8/31/22. FLCN testing was ordered from [facility] This testing was done because of Denise's personal and family history of kidney, colon, breast, and thyroid cancer and known FLCN mutation.    Genetic Testing Result: NEGATIVE  Denise's test results were negative. Denise does not have the mutation previously identified in her father. The mutation that was identified in her father was in the FLCN gene. Specifically, this mutation is called c.1252del (p.[id number]*50). This test only looked for this one mutation.      A copy of the test report can be found in the Laboratory tab, dated 9/7/22, and named \"LABORATORY MISCELLANEOUS ORDER\". The report is scanned in as a linked document.    Interpretation:  We discussed that given the known FLCN mutation that was identified in her father, Denise is at low risk for hereditary cancer given that she was negative for the known mutation in the family.      Screening:  Based on this negative test result, it is important for Denise and her relatives to refer back to the family history for appropriate cancer screening.      Population cancer screening options, such as those recommended by the American Cancer Society and the National Comprehensive Cancer Network (NCCN), are also appropriate for Denise and her family. These screening recommendations may change if there are changes to Denise's personal and/or family history of cancer. Final screening recommendations should be made by each individual's primary care provider.      Inheritance:  We reviewed autosomal dominant inheritance. We discussed that Denise cannot pass on an identifiable mutation in these genes to her children based on this test result. Mutations in these genes do not skip generations.      Additional Testing " Considerations:  Although Denise's genetic testing result was negative, other relatives may still carry a gene mutation associated with breast, colon, thyroid, and/or kidney cancer. Genetic counseling is recommended for her maternal grandmother and other close paternal relatives to discuss genetic testing options. If any of these relatives do pursue genetic testing, Denise is encouraged to contact me so that we may review the impact of their test results on her.    Summary:  We do not have an explanation for Denise's maternal family history of cancer. While no genetic changes were identified, Denise may still be at risk for certain cancers due to family history, environmental factors, or other genetic causes not identified by this test. Because of that, it is important that she continue with cancer screening based on her personal and family history as discussed above.    Genetic testing is rapidly advancing, and new cancer susceptibility genes will most likely be identified in the future. Therefore, I encouraged Denise to contact me annually or if there are changes in her personal or family history. This may change how we assess her cancer risk, screening, and the testing we would offer.    Plan:  1.  A copy of the test results will be mailed to Denise.  2. She plans to follow-up with her other providers.  3. She should contact me regularly, or sooner if her family history changes.    If Denise has any further questions, I encouraged her to contact me via [employer].    Time spent on the phone: 5 minutes.    Aron Latham MS, [facility]  Licensed, Certified Genetic Counselor

## 2022-09-26 ENCOUNTER — VIRTUAL VISIT (OUTPATIENT)
Dept: FAMILY MEDICINE | Facility: OTHER | Age: 27
End: 2022-09-26
Payer: COMMERCIAL

## 2022-09-26 DIAGNOSIS — E66.3 OVER WEIGHT: Primary | ICD-10-CM

## 2022-09-26 PROCEDURE — 99214 OFFICE O/P EST MOD 30 MIN: CPT | Mod: 95 | Performed by: NURSE PRACTITIONER

## 2022-09-26 RX ORDER — PHENTERMINE HYDROCHLORIDE 37.5 MG/1
37.5 TABLET ORAL
Qty: 30 TABLET | Refills: 0 | Status: SHIPPED | OUTPATIENT
Start: 2022-09-26

## 2022-09-26 NOTE — PROGRESS NOTES
Denise is a 27 year old who is being evaluated via a billable video visit.      How would you like to obtain your AVS? MyChart  If the video visit is dropped, the invitation should be resent by: Text to cell phone: [phone]  Will anyone else be joining your video visit? No        Assessment & Plan     Over weight   BMI 27.0-27.9,adult  Patient not seeing results with low dose, will trial increased dose. Denies any side effects. Follow up in 1 month. Could discuss other options if no improvements with current medication. However, given her BMI I am not sure if she would qualify.   - phentermine (ADIPEX-P) 37.5 MG tablet; Take 1 tablet (37.5 mg) by mouth every morning (before breakfast)            There are no Patient Instructions on file for this visit.    No follow-ups on file.    GAYLE Lee CNP  [facility]    Rebecca Walker is a 27 year old, presenting for the following health issues:  No chief complaint on file.      History of Present Illness       Reason for visit:  I think a follow-up on a new medication    She eats 2-3 servings of fruits and vegetables daily.She consumes 1 sweetened beverage(s) daily.She exercises with enough effort to increase her heart rate 9 or less minutes per day.  She exercises with enough effort to increase her heart rate 3 or less days per week. She is missing 2 dose(s) of medications per week.  She is not taking prescribed medications regularly due to remembering to take.         Medication Followup of Phentermine    Taking Medication as prescribed: yes    Side Effects:  None    Medication Helping Symptoms:  NO Not seeing a change. But willing to trial another medication.     Weight-   At the same weight.   Water intake  Nutrition- weight watchers.    She has not noticed much difference.   Taking the 15mg dosing.       Review of Systems     Objective           Vitals:  No vitals were obtained today due to virtual visit.    Physical Exam    GENERAL: Healthy, alert and no distress  PSYCH: Mentation appears normal, affect normal/bright, judgement and insight intact, normal speech and appearance well-groomed.    Diagnostic: None     Telephone: 7 minutes

## 2022-10-16 ENCOUNTER — HEALTH MAINTENANCE LETTER (OUTPATIENT)
Age: 27
End: 2022-10-16

## 2022-11-07 RX ORDER — PHENTERMINE HYDROCHLORIDE 37.5 MG/1
TABLET ORAL
Qty: 30 TABLET | OUTPATIENT
Start: 2022-11-07

## 2022-11-07 NOTE — TELEPHONE ENCOUNTER
I am sorry to hear that and I understand. Unfortunately this is a controlled substance and we need to have a visit to discuss how things are going before doing refills etc.       GAYLE Lee CNP  Questions or concerns please feel free to send me a [employer] message or call me  Phone : [phone]

## 2022-11-07 NOTE — TELEPHONE ENCOUNTER
Pt returned call. Read message from provider below.     Pt states she just got new insurance and has not met her deductible. Patient has paid 100% OOP for her past few appts and cannot afford to come in again. Asking to send message to provider as FYI. Patient stated she will try and call in a couple weeks and make an appt.

## 2022-11-07 NOTE — TELEPHONE ENCOUNTER
Patient was notified at virtual visit she needs to have a follow up visit for med recheck, please have her schedule a virtual visit with me and nurse visit for vitals as we need bp and weight check with this medication.       GAYLE Lee CNP  Questions or concerns please feel free to send me a [employer] message or call me  Phone : [phone]

## 2023-02-16 ENCOUNTER — MYC REFILL (OUTPATIENT)
Dept: FAMILY MEDICINE | Facility: OTHER | Age: 28
End: 2023-02-16
Payer: COMMERCIAL

## 2023-02-16 DIAGNOSIS — F41.1 GAD (GENERALIZED ANXIETY DISORDER): ICD-10-CM

## 2023-02-16 RX ORDER — ALPRAZOLAM 0.5 MG
.5-1 TABLET ORAL DAILY
Qty: 30 TABLET | Refills: 0 | Status: SHIPPED | OUTPATIENT
Start: 2023-02-16 | End: 2023-02-16

## 2023-02-16 RX ORDER — ALPRAZOLAM 0.5 MG
.5-1 TABLET ORAL DAILY
Qty: 30 TABLET | Refills: 0 | Status: SHIPPED | OUTPATIENT
Start: 2023-02-16

## 2023-02-16 NOTE — TELEPHONE ENCOUNTER
Approved Xanax per discussion at her visit in August.   I would like to see her in clinic to discuss her weight loss medication and where she is at.       GAYLE Lee CNP  Questions or concerns please feel free to send me a [employer] message or call me  Phone : [phone]

## 2023-02-16 NOTE — TELEPHONE ENCOUNTER
Please call [facility] in [place] and cancel Xanax RX.     I sent a new one to [facility].       GAYLE Lee CNP  Questions or concerns please feel free to send me a [employer] message or call me  Phone : [phone]

## 2023-02-17 NOTE — TELEPHONE ENCOUNTER
Spoke with pharmacist at [facility] and had them cancel script for Xanax 0.5 mg per Gladys Wood.  Script was sent to another pharmacy.    Closing encounter.    Betsy Rodriguez RN  [facility] ~ Registered Nurse  Clinic Triage ~ [place] [facility]  February 17, 2023

## 2023-02-24 ENCOUNTER — VIRTUAL VISIT (OUTPATIENT)
Dept: FAMILY MEDICINE | Facility: OTHER | Age: 28
End: 2023-02-24
Payer: COMMERCIAL

## 2023-02-24 DIAGNOSIS — F41.1 GAD (GENERALIZED ANXIETY DISORDER): Primary | ICD-10-CM

## 2023-02-24 DIAGNOSIS — F41.0 PANIC ATTACK: ICD-10-CM

## 2023-02-24 DIAGNOSIS — F33.2 SEVERE EPISODE OF RECURRENT MAJOR DEPRESSIVE DISORDER, WITHOUT PSYCHOTIC FEATURES (H): ICD-10-CM

## 2023-02-24 PROCEDURE — 96127 BRIEF EMOTIONAL/BEHAV ASSMT: CPT | Performed by: NURSE PRACTITIONER

## 2023-02-24 PROCEDURE — 99214 OFFICE O/P EST MOD 30 MIN: CPT | Mod: TEL | Performed by: NURSE PRACTITIONER

## 2023-02-24 RX ORDER — BUSPIRONE HYDROCHLORIDE 10 MG/1
10 TABLET ORAL DAILY
Qty: 30 TABLET | Refills: 0 | Status: SHIPPED | OUTPATIENT
Start: 2023-02-24

## 2023-02-24 ASSESSMENT — ANXIETY QUESTIONNAIRES
GAD7 TOTAL SCORE: 8
5. BEING SO RESTLESS THAT IT IS HARD TO SIT STILL: NOT AT ALL
IF YOU CHECKED OFF ANY PROBLEMS ON THIS QUESTIONNAIRE, HOW DIFFICULT HAVE THESE PROBLEMS MADE IT FOR YOU TO DO YOUR WORK, TAKE CARE OF THINGS AT HOME, OR GET ALONG WITH OTHER PEOPLE: SOMEWHAT DIFFICULT
6. BECOMING EASILY ANNOYED OR IRRITABLE: SEVERAL DAYS
4. TROUBLE RELAXING: NOT AT ALL
8. IF YOU CHECKED OFF ANY PROBLEMS, HOW DIFFICULT HAVE THESE MADE IT FOR YOU TO DO YOUR WORK, TAKE CARE OF THINGS AT HOME, OR GET ALONG WITH OTHER PEOPLE?: SOMEWHAT DIFFICULT
7. FEELING AFRAID AS IF SOMETHING AWFUL MIGHT HAPPEN: NEARLY EVERY DAY
1. FEELING NERVOUS, ANXIOUS, OR ON EDGE: MORE THAN HALF THE DAYS
7. FEELING AFRAID AS IF SOMETHING AWFUL MIGHT HAPPEN: NEARLY EVERY DAY
GAD7 TOTAL SCORE: 8
GAD7 TOTAL SCORE: 8
3. WORRYING TOO MUCH ABOUT DIFFERENT THINGS: SEVERAL DAYS
2. NOT BEING ABLE TO STOP OR CONTROL WORRYING: SEVERAL DAYS

## 2023-02-24 ASSESSMENT — ENCOUNTER SYMPTOMS: NERVOUS/ANXIOUS: 1

## 2023-02-24 ASSESSMENT — ASTHMA QUESTIONNAIRES
QUESTION_2 LAST FOUR WEEKS HOW OFTEN HAVE YOU HAD SHORTNESS OF BREATH: ONCE OR TWICE A WEEK
QUESTION_5 LAST FOUR WEEKS HOW WOULD YOU RATE YOUR ASTHMA CONTROL: WELL CONTROLLED
QUESTION_3 LAST FOUR WEEKS HOW OFTEN DID YOUR ASTHMA SYMPTOMS (WHEEZING, COUGHING, SHORTNESS OF BREATH, CHEST TIGHTNESS OR PAIN) WAKE YOU UP AT NIGHT OR EARLIER THAN USUAL IN THE MORNING: ONCE A WEEK
QUESTION_4 LAST FOUR WEEKS HOW OFTEN HAVE YOU USED YOUR RESCUE INHALER OR NEBULIZER MEDICATION (SUCH AS ALBUTEROL): TWO OR THREE TIMES PER WEEK
ACT_TOTALSCORE: 19
QUESTION_1 LAST FOUR WEEKS HOW MUCH OF THE TIME DID YOUR ASTHMA KEEP YOU FROM GETTING AS MUCH DONE AT WORK, SCHOOL OR AT HOME: NONE OF THE TIME
ACT_TOTALSCORE: 19

## 2023-02-24 ASSESSMENT — PATIENT HEALTH QUESTIONNAIRE - PHQ9
SUM OF ALL RESPONSES TO PHQ QUESTIONS 1-9: 5
SUM OF ALL RESPONSES TO PHQ QUESTIONS 1-9: 5
10. IF YOU CHECKED OFF ANY PROBLEMS, HOW DIFFICULT HAVE THESE PROBLEMS MADE IT FOR YOU TO DO YOUR WORK, TAKE CARE OF THINGS AT HOME, OR GET ALONG WITH OTHER PEOPLE: SOMEWHAT DIFFICULT

## 2023-02-24 NOTE — PATIENT INSTRUCTIONS
Counseling:   [facility] (they will call you to schedule)  Other options  [facility] (180) [phone]  [facility] [place] - [phone]  [facility] [place] - [phone]  [facility]- [phone]  [facility] ([place]) - [phone]  [facility] ([place]) - [phone]  [facility]- [facility]) - [phone]  [facility] ([place]) - [phone]      Resources:  Suicide Prevention Lifeline - 5-218-321-9519  Crisis Intervention: 677.760.9214 or 372-291-3894. Call anytime for help.  National [place] on Mental [place]ness (www.[place].tomi.org): 268.607.8381 or 393-286-6508.  [place] Association for Children's Mental Health (www.macmh.org): 968.979.3144  Awareness Voices of Education (SAVE) (www.save.org): 152.493.2707  National Suicide Prevention Line (www.mentalhealthmn.org): 845.815.4885  Mental Health Consumer/Survivor Network of [place] (www.mhcsn.net): 170.914.3183 or 706-004-0686

## 2023-02-24 NOTE — PROGRESS NOTES
Denise is a 27 year old who is being evaluated via a billable video visit.      How would you like to obtain your AVS? MyChart  If the video visit is dropped, the invitation should be resent by: Text to cell phone: [phone]  Will anyone else be joining your video visit? No        Assessment & Plan     YARI (generalized anxiety disorder)  Unstable  Discussed counseling   Unsure about daily medication, willing to trial Buspar daily, normally I would increase this gradually. However, she is uncertain how compliant she will be so will start with once daily and recheck at virtual to see if she wants to continue or if she finds this effective.   Will start counseling given her anxiety and recent grief of her father.   - Adult Mental Health  Referral; Future  - busPIRone (BUSPAR) 10 MG tablet; Take 1 tablet (10 mg) by mouth daily    Panic attack  Ok for PRN use of Xanax.   - Adult Mental Health  Referral; Future  - busPIRone (BUSPAR) 10 MG tablet; Take 1 tablet (10 mg) by mouth daily    Severe episode of recurrent major depressive disorder, without psychotic features (H)    - Adult Mental Health  Referral; Future  - busPIRone (BUSPAR) 10 MG tablet; Take 1 tablet (10 mg) by mouth daily    The patient indicates understanding of these issues and agrees with the plan.      Patient Instructions   Counseling:   [facility] (they will call you to schedule)  Other options  [facility] ([phone]) [phone]  [facility] [place] - [phone]  [facility] [place] - [phone]  [facility]- [phone]  [facility] ([place]) - [phone]  [facility] ([place]) - [phone]  [facility]- ([place]) - [phone]  [facility] [place]) - [phone]      Resources:  Suicide Prevention Lifeline - 2-137-045-7750  Crisis Intervention: 989.245.4862 or 145-144-4884. Call  anytime for help.  National [place] on Mental Illness (www.mn.tomi.org): 451-080-1105 or 328-469-6596.  [place] Association for [place]'s Mental Health (www.macmh.org): 664.936.3066  Awareness Voices of Education (SAVE) (www.save.org): 192.999.7355  [place] Suicide Prevention Line (www.mentalhealthmn.org): 552.519.4921  Mental Health Consumer/Survivor Network of [place] (www.mhcsn.net): 772.683.2877 or 644-469-9984        Return in about 4 weeks (around 3/24/2023).    GAYLE Lee [facility] BELA Walker is a 27 year old, presenting for the following health issues:  Anxiety (Panic Attacks)      Anxiety    History of Present Illness       Mental Health Follow-up:  Patient presents to follow-up on Depression & Anxiety.Patient's depression since last visit has been:  Worse  The patient is not having other symptoms associated with depression.  Patient's anxiety since last visit has been:  Worse  The patient is not having other symptoms associated with anxiety.  Any significant life events: grief or loss  Patient is feeling anxious or having panic attacks.  Patient has no concerns about alcohol or drug use.    She eats 2-3 servings of fruits and vegetables daily.She consumes 0 sweetened beverage(s) daily.She exercises with enough effort to increase her heart rate 9 or less minutes per day.  She exercises with enough effort to increase her heart rate 3 or less days per week.   She is taking medications regularly.    Today's PHQ-9         PHQ-9 Total Score: 5    PHQ-9 Q9 Thoughts of better off dead/self-harm past 2 weeks :   Not at all    How difficult have these problems made it for you to do your work, take care of things at home, or get along with other people: Somewhat difficult  Today's YARI-7 Score: 8     Patient went to [place] for her best friends wedding things were good. Then one day she got really anxious, more than she has in the past. She convinced herself that she is  dying for no reason and having panic attacks after this. This was all day one day and then finally it got back. She was fearful of sleep. Woke up the next day and they continued. There was only one day that it was all day. Now that she is home it seems a little better not sure if it is being gone or everything with father passing away. Now she is scared of having a panic attack and then that does not help because she will get anxious and it starts again.     #30 tablets and has not used the Xanax since she has been.       Review of Systems   Psychiatric/Behavioral: The patient is nervous/anxious.             Objective           Vitals:  No vitals were obtained today due to virtual visit.    Physical Exam   GENERAL: Healthy, alert and no distress  NEURO: Cranial nerves grossly intact.  Mentation and speech appropriate for age.  PSYCH: Mentation appears normal, affect normal/bright, judgement and insight intact, normal speech and appearance well-groomed.    No results found for any visits on 02/24/23.            Telephone Visit    Changed to telephone visit  10 minutes

## 2024-06-01 ENCOUNTER — HEALTH MAINTENANCE LETTER (OUTPATIENT)
Age: 29
End: 2024-06-01

## 2025-07-17 ENCOUNTER — PATIENT OUTREACH (OUTPATIENT)
Dept: CARE COORDINATION | Facility: CLINIC | Age: 30
End: 2025-07-17